# Patient Record
Sex: FEMALE | Race: WHITE
[De-identification: names, ages, dates, MRNs, and addresses within clinical notes are randomized per-mention and may not be internally consistent; named-entity substitution may affect disease eponyms.]

---

## 2017-10-20 ENCOUNTER — HOSPITAL ENCOUNTER (OUTPATIENT)
Dept: HOSPITAL 45 - C.EDB | Age: 57
Setting detail: OBSERVATION
LOS: 1 days | Discharge: HOME | End: 2017-10-21
Attending: FAMILY MEDICINE | Admitting: HOSPITALIST
Payer: COMMERCIAL

## 2017-10-20 VITALS
HEIGHT: 65 IN | WEIGHT: 177.03 LBS | BODY MASS INDEX: 29.49 KG/M2 | HEIGHT: 65 IN | BODY MASS INDEX: 29.49 KG/M2 | WEIGHT: 177.03 LBS

## 2017-10-20 VITALS — DIASTOLIC BLOOD PRESSURE: 80 MMHG | SYSTOLIC BLOOD PRESSURE: 145 MMHG | TEMPERATURE: 97.88 F | HEART RATE: 69 BPM

## 2017-10-20 VITALS
TEMPERATURE: 97.7 F | DIASTOLIC BLOOD PRESSURE: 77 MMHG | HEART RATE: 72 BPM | OXYGEN SATURATION: 97 % | SYSTOLIC BLOOD PRESSURE: 125 MMHG

## 2017-10-20 VITALS — OXYGEN SATURATION: 95 %

## 2017-10-20 DIAGNOSIS — K21.9: ICD-10-CM

## 2017-10-20 DIAGNOSIS — E03.9: ICD-10-CM

## 2017-10-20 DIAGNOSIS — Z79.899: ICD-10-CM

## 2017-10-20 DIAGNOSIS — R07.2: Primary | ICD-10-CM

## 2017-10-20 DIAGNOSIS — I10: ICD-10-CM

## 2017-10-20 DIAGNOSIS — E11.9: ICD-10-CM

## 2017-10-20 LAB
ALBUMIN/GLOB SERPL: 1.1 {RATIO} (ref 0.9–2)
ALP SERPL-CCNC: 71 U/L (ref 45–117)
ALT SERPL-CCNC: 29 U/L (ref 12–78)
ANION GAP SERPL CALC-SCNC: 7 MMOL/L (ref 3–11)
AST SERPL-CCNC: 24 U/L (ref 15–37)
BUN SERPL-MCNC: 15 MG/DL (ref 7–18)
BUN/CREAT SERPL: 17.9 (ref 10–20)
CALCIUM SERPL-MCNC: 9.6 MG/DL (ref 8.5–10.1)
CHLORIDE SERPL-SCNC: 104 MMOL/L (ref 98–107)
CKMB/CK RATIO: 1.1 (ref 0–3)
CO2 SERPL-SCNC: 27 MMOL/L (ref 21–32)
CREAT CL PREDICTED SERPL C-G-VRATE: 77.4 ML/MIN
CREAT SERPL-MCNC: 0.86 MG/DL (ref 0.6–1.2)
EOSINOPHIL NFR BLD AUTO: 172 K/UL (ref 130–400)
GLOBULIN SER-MCNC: 3.7 GM/DL (ref 2.5–4)
GLUCOSE SERPL-MCNC: 108 MG/DL (ref 70–99)
HCT VFR BLD CALC: 41.6 % (ref 37–47)
INR PPP: 0.9 (ref 0.9–1.1)
MCH RBC QN AUTO: 30.3 PG (ref 25–34)
MCHC RBC AUTO-ENTMCNC: 34.1 G/DL (ref 32–36)
MCV RBC AUTO: 88.7 FL (ref 80–100)
PARTIAL THROMBOPLASTIN RATIO: 0.9
PMV BLD AUTO: 11.8 FL (ref 7.4–10.4)
POTASSIUM SERPL-SCNC: 4 MMOL/L (ref 3.5–5.1)
PROTHROMBIN TIME: 10 SECONDS (ref 9–12)
RBC # BLD AUTO: 4.69 M/UL (ref 4.2–5.4)
SODIUM SERPL-SCNC: 138 MMOL/L (ref 136–145)
WBC # BLD AUTO: 9.05 K/UL (ref 4.8–10.8)

## 2017-10-20 NOTE — HISTORY AND PHYSICAL
History & Physical


Date & Time of Service:


Oct 20, 2017 at 21:11


Chief Complaint:


Chest Pain


Primary Care Physician:


Erma Verma


History of Present Illness


Source:  patient, family


The patient is a 57 y/o F who presents with complaints of chest pain that 

started around 3 hours ago. Her pain is a 3/10 and is intermittent. Radiates to 

her lower chest. She also reports having had leg cramps last night as well as 

some heart burn that persisted into today. Denies chest pain or shortness of 

breath with inspiration. The patient notes that she does get leg cramps 

intermittently, but last night the cramping was worse than normal. She denies 

any recent travel except for a 1 hr car ride. She also reports headaches 

intermittently. Overall she just doesn't feel like herself and fatigued.  She 

was also recently started on Synthroid. She also noted that her blood pressure 

was waxing and waning. 





She went to the [facility] walk in before coming to the ER where she was told 

her EKG had some changes. She was given aspirin at the clinic. She does not 

have prior cardiac history. Her father did pass from a PE. She has no personal 

history of blood clots. She has had some stressors recently with her son and 

cat that passed away. 





Denies smoking


Denies history of MI or Strokes


Stays home





Past Medical/Surgical History


Medical Problems:


(1) Diabetes


Status: Chronic  





(2) History of bowel removal from diverticulitis


Status: Chronic  





(3) HTN (hypertension)


Status: Chronic  











Family History





Diabetes mellitus


FH: pulmonary embolism


Gallbladder disease


Heart disease


Hypertension





Social History


Smoking Status:  Never Smoker


Marital Status:  


Occupational Status:  employed





Immunizations


History of Influenza Vaccine:  No


History of Tetanus Vaccine?:  No


Tetanus Immunization Date:  Feb 13, 2005


History of Pneumococcal:  No


History of Hepatitis B Vaccine:  No





Multi-Drug Resistant Organisms


History of MDRO:  No





Allergies


Coded Allergies:  


     Morphine (Verified  Allergy, Intermediate, RASH, 10/20/17)





Home Medications


Scheduled


Hctz/Lisinopril (PRINZIDE 20/12.5 Mg), 0.5 TAB PO DAILY


Levothyroxine Sodium (Synthroid), 25 MCG PO DAILY


Multiple Vitamin (Multivitamin), 1 TAB PO QAM


[Essential Oils], 1 CAP PO QAM





Review of Systems


Constitutional:  No fever, No chills


Eyes:  No worsening of vision


Respiratory:  No cough, No sputum, No wheezing, No shortness of breath, No 

dyspnea on exertion, No dyspnea at rest


Cardiovascular:  + chest pain, No edema


Abdomen:  + nausea, No pain, No vomiting, No diarrhea, No constipation


Genitourinary - Female:  No dysuria, No urinary frequency, No urinary urgency





Physical Exam


Vital Signs











  Date Time  Temp Pulse Resp B/P (MAP) Pulse Ox O2 Delivery O2 Flow Rate FiO2


 


10/20/17 20:06  90 17 115/73 96 Room Air  


 


10/20/17 19:42  87 20  93   


 


10/20/17 19:31    133/100    


 


10/20/17 19:12  90 22  92   


 


10/20/17 19:01    155/96    


 


10/20/17 18:53  97      


 


10/20/17 18:50    148/86    


 


10/20/17 18:42     95 Room Air  


 


10/20/17 18:42     95 Room Air  


 


10/20/17 18:42 37.2 80 17 148/86 95 Room Air  








General Appearance:  no apparent distress


Head:  normocephalic, atraumatic


Eyes:  normal inspection, PERRL, EOMI


ENT:  hearing grossly normal, pharynx normal


Neck:  no adenopathy, no JVD


Respiratory/Chest:  lungs clear, normal breath sounds, no respiratory distress, 

no accessory muscle use


Cardiovascular:  regular rate, rhythm, no edema, no murmur, normal peripheral 

pulses


Abdomen/GI:  normal bowel sounds, non tender, soft


Back:  no CVA tenderness, no muscle spasm, normal range of motion


Extremities/Musculoskelatal:  no calf tenderness, no pedal edema


Neurologic/Psych:  CNs II-XII nml as tested, no motor/sensory deficits, alert, 

normal mood/affect, normal reflexes, oriented x 3





Diagnostics


Laboratory Results





Results Past 24 Hours








Test


  10/20/17


19:00 10/20/17


19:12 10/20/17


20:19 10/20/17


20:56 Range/Units


 


 


White Blood Count 9.05    4.8-10.8  K/uL


 


Red Blood Count 4.69    4.2-5.4  M/uL


 


Hemoglobin 14.2    12.0-16.0  g/dL


 


Hematocrit 41.6    37-47  %


 


Mean Corpuscular Volume 88.7      fL


 


Mean Corpuscular Hemoglobin 30.3    25-34  pg


 


Mean Corpuscular Hemoglobin


Concent 34.1


  


  


  


  32-36  g/dl


 


 


RDW Standard Deviation 42.8    36.4-46.3  fL


 


RDW Coefficient of Variation 13.3    11.5-14.5  %


 


Platelet Count 172    130-400  K/uL


 


Mean Platelet Volume 11.8    7.4-10.4  fL


 


Sodium Level 138    136-145  mmol/L


 


Potassium Level 4.0    3.5-5.1  mmol/L


 


Chloride Level 104      mmol/L


 


Carbon Dioxide Level 27    21-32  mmol/L


 


Anion Gap 7.0    3-11  mmol/L


 


Blood Urea Nitrogen 15    7-18  mg/dl


 


Creatinine


  0.86


  


  


  


  0.60-1.20


mg/dl


 


Est Creatinine Clear Calc


Drug Dose 77.4


  


  


  


   ml/min


 


 


Estimated GFR (


American) 88.1


  


  


  


   


 


 


Estimated GFR (Non-


American 76.1


  


  


  


   


 


 


BUN/Creatinine Ratio 17.9    10-20  


 


Random Glucose 108    70-99  mg/dl


 


Calcium Level 9.6    8.5-10.1  mg/dl


 


Total Bilirubin 0.3    0.2-1  mg/dl


 


Aspartate Amino Transf


(AST/SGOT) 24


  


  


  


  15-37  U/L


 


 


Alanine Aminotransferase


(ALT/SGPT) 29


  


  


  


  12-78  U/L


 


 


Alkaline Phosphatase 71      U/L


 


Total Creatine Kinase 157      U/L


 


Creatine Kinase MB 1.7    0.5-3.6  ng/ml


 


Creatine Kinase MB Ratio 1.1    0-3.0  


 


Total Protein 7.8    6.4-8.2  gm/dl


 


Albumin 4.1    3.4-5.0  gm/dl


 


Globulin 3.7    2.5-4.0  gm/dl


 


Albumin/Globulin Ratio 1.1    0.9-2  


 


Bedside Troponin I  < 0.030   0-0.045  ng/ml


 


Test


  10/20/17


21:04 


  


  


  Range/Units


 











Impression


Assessment and Plan


This is a 57 y/o F who presents with chest pain, fatigue and overall feeling 

"unwell". 





Chest pain r/o vs. inflammatory conditions vs. GERD vs. anxiety:


Initial troponin negative


trend x 3 


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


Hold on stress echo


?inflammatory conditions- ESR, CELSO ordered


D-Dimer pending





Diabetes, diet controlled


Diabetic diet





HTN


HCTZ/Losartan





DVT proph


Lovenox





Gerd


Protonix





COde:


Full








Attending Addendum:








I have physically seen and examined this patient, have directed the resident's 

medical activities, and agree with the H&P as noted above with the following 

exceptions as noted.





The patient is awake,  alert and oriented 3, well-developed and well-nourished

, normocephalic and atraumatic, lying in bed and in no acute distress.





HEENT--PERRL, EOMI, mucous membranes  and oropharynx dry.


Neck--supple, no JVD or bruits, thyroid normal, trachea midline, no adenopathy.


Heart--normal S1 and S2, no extra beats, no murmurs, rubs or gallops.


Lungs--clear bilaterally with good air movement, no respiratory distress, no 

accessory muscle use.


Abdomen--normal bowel sounds and soft, nontender and nondistended, no hernias 

or masses,  no organomegaly.


Extremities--no cyanosis, clubbing or edema. There are good distal pulses b/l.


Dermatologic--normal skin turgor, normal color, warm and dry, no abnormal lymph 

nodes, no rash.


Neurologic--cranial nerves II through XII grossly intact.


Rheumatologic--normal range of motion.


Psychiatric--normal affect.








Assessment and Plan:








Precordial chest pain/myalgias and arthralgias/fatigue--


The patient will be admitted to telemetry for serial cardiac enzymes, cardiac 

rhythm monitoring and a 2-D echocardiogram with Dopplers.


Order sedimentation rate, CELSO and Lyme.


D-dimer pending.





Diet-controlled diabetes mellitus--


Place on Accu-Cheks before meals and at bedtime with NovoLog coverage per 

scale. 





Hypertension--


Continue HCTZ/losartan.





GERD--continue Protonix








Level of Care


Telemetry





Advanced Directives


Existing Advance Directive:  No


Existing Living Will:  No


Existing Power of :  No





Resuscitation Status


FULL RESUSCITATION





VTE Prophylaxis


VTE Risk Assessment Done? Y/N:  Yes


Risk Level:  Moderate


Given or contraindicated:  SCD's





Social Service Consult


None Apply

## 2017-10-20 NOTE — EMERGENCY ROOM VISIT NOTE
History


Report prepared by Cuate:  Agustin George


Under the Supervision of:  Dr. Gretchen Magaña M.D.


First contact with patient:  19:25


Chief Complaint:  PALPITATIONS


Stated Complaint:  CHEST PAIN


Nursing Triage Summary:  


Pt reports that today at noon, while grocery shopping pt began to exerience 


palpitations. Pt reported feeling a "twinge" in the middle of the chest. Slight 


nausea. [place] like her heart was racing. Also reporting heart burn, and 

headache. 


Symptoms persisted off and on throughout the day and pt went to walk-in clinic 


at 1800. EKG performed there and found to be "abnormal". Given 324 ASA. Sent to 


[facility] via Ambulance. 











Hx of HTN and acid reflux. Tx reflux with peppermint oil beads.





History of Present Illness


The patient is a 56 year old female who presents to the Emergency Room via EMS 

with complaints of persistent chest pain that started around 3 hours ago. She 

says that last night she had cramping in her legs and toes, and had really bad 

heartburn. The patient notes that she does get leg cramps intermittently, but 

last night the cramping was worse than normal. She adds that her left leg was 

the most painful. The patient says that her chest pain started around 3 hours 

ago, and she describes it as intermittent quick shooting pains, which started 

underneath her breast bone, and then moved up to the middle of her chest. She 

notes that it is not very painful taking a deep breath. The patient currently 

rates her pain as a 3 out of 10 in severity. She adds that she has a headache 

too, but has been getting some headaches recently. She notes that over the past 

few months, she has felt "weird" and very tired if she overexerts herself. She 

went to her doctor, and was put on thyroid medication 7 weeks ago. She notes 

that she has not been having chest pain with exertion, but she has been getting 

a bit short of breath. The patient denies a worsening cough. She notes no 

history of blood clots, but her father did die of a pulmonary embolism. She 

notes no recent trips or surgeries, but she adds that she has been under a lot 

of stress recently, with her son being in some trouble and her cat dying. The 

patient adds that she was given Aspirin at the clinic prior to arrival today. 

She has a family history of heart disease. The patient is not on any blood 

thinners and she is a non-smoker.





   Source of History:  patient


   Onset:  3 hours ago


   Position:  chest


   Quality:  other (quick shooting pains)


   Timing:  other (persistent)


   Associated Symptoms:  + headache, + SOB (recently if exerts herself too much)

, + fatigue (over past few months ), No cough (any worsened)


Note:


Associated symptoms: Last night had cramping in legs and toes and had bad 

heartburn.





Review of Systems


See HPI for pertinent positives & negatives. A total of 10 systems reviewed and 

were otherwise negative.





Past Medical & Surgical


Medical Problems:


(1) Diabetes


(2) History of bowel removal from diverticulitis


(3) HTN (hypertension)








Family History





Diabetes mellitus


FH: pulmonary embolism


Gallbladder disease


Heart disease


Hypertension





Social History


Smoking Status:  Never Smoker


Marital Status:  


Housing Status:  lives with family


Occupation Status:  employed





Current/Historical Medications


Scheduled


Hctz/Lisinopril (PRINZIDE 20/12.5 Mg), 0.5 TAB PO DAILY


Levothyroxine Sodium (Synthroid), 25 MCG PO DAILY


Multiple Vitamin (Multivitamin), 1 TAB PO QAM


[Essential Oils], 1 CAP PO QAM





Allergies


Coded Allergies:  


     Morphine (Verified  Allergy, Intermediate, RASH, 10/20/17)





Physical Exam


Vital Signs











  Date Time  Temp Pulse Resp B/P (MAP) Pulse Ox O2 Delivery O2 Flow Rate FiO2


 


10/20/17 21:01    108/86    


 


10/20/17 20:47  87 16  97   


 


10/20/17 20:30    113/70    


 


10/20/17 20:17  88 20  96   


 


10/20/17 20:06  90 17 115/73 96 Room Air  


 


10/20/17 20:06    115/73    


 


10/20/17 19:47  93 29  94   


 


10/20/17 19:42  87 20  93   


 


10/20/17 19:31    133/100    


 


10/20/17 19:12  90 22  92   


 


10/20/17 19:01    155/96    


 


10/20/17 18:53  97      


 


10/20/17 18:50    148/86    


 


10/20/17 18:42     95 Room Air  


 


10/20/17 18:42     95 Room Air  


 


10/20/17 18:42 37.2 80 17 148/86 95 Room Air  











Physical Exam


Vital signs reviewed.





General: Well-appearing 56 year old female, in no significant distress.





HEENT: No scleral icterus, PERRLA, neck supple.  Atraumatic.





Cardiovascular: Regular rate and rhythm, no extra sounds.





Pulmonary: Clear to auscultation bilaterally, normal work of breathing.





Abdomen: Soft, nontender, nondistended, positive bowel sounds.





Musculoskeletal: Atraumatic, no peripheral edema.





Neurologic: Patient awake alert and oriented x 3, full strength in all 4 

extremities.  Cranial nerves 2 through 12 grossly intact.





Skin: Warm, dry, no rash





Medical Decision & Procedures


ER Provider


Diagnostic Interpretation:


X-ray results as stated below per interpretation by me and the radiologist: 








CHEST ONE VIEW PORTABLE





CLINICAL HISTORY: Chest pain.    





COMPARISON STUDY:  Chest radiograph November 30, 2017.





FINDINGS: Lung volumes are normal. Mild left basilar opacity is suggestive of


atelectasis. There is no evidence of pulmonary edema. There is no consolidation


to suggest pneumonia. The cardiomediastinal silhouette is normal. 





IMPRESSION:  No acute cardiopulmonary findings. 





Electronically signed by:  Leonid Anthony M.D.


10/20/2017 7:40 PM





Dictated Date/Time:  10/20/2017 7:39 PM





Laboratory Results


10/20/17 19:00








10/20/17 19:00

















Test


  10/20/17


19:00 10/20/17


19:12


 


Red Blood Count


  4.69 M/uL


(4.2-5.4) 


 


 


Mean Corpuscular Volume


  88.7 fL


() 


 


 


Mean Corpuscular Hemoglobin


  30.3 pg


(25-34) 


 


 


Mean Corpuscular Hemoglobin


Concent 34.1 g/dl


(32-36) 


 


 


RDW Standard Deviation


  42.8 fL


(36.4-46.3) 


 


 


RDW Coefficient of Variation


  13.3 %


(11.5-14.5) 


 


 


Mean Platelet Volume


  11.8 fL


(7.4-10.4) 


 


 


Anion Gap


  7.0 mmol/L


(3-11) 


 


 


Est Creatinine Clear Calc


Drug Dose 77.4 ml/min 


  


 


 


Estimated GFR (


American) 88.1 


  


 


 


Estimated GFR (Non-


American 76.1 


  


 


 


BUN/Creatinine Ratio 17.9 (10-20)  


 


Calcium Level


  9.6 mg/dl


(8.5-10.1) 


 


 


Total Bilirubin


  0.3 mg/dl


(0.2-1) 


 


 


Aspartate Amino Transf


(AST/SGOT) 24 U/L (15-37) 


  


 


 


Alanine Aminotransferase


(ALT/SGPT) 29 U/L (12-78) 


  


 


 


Alkaline Phosphatase


  71 U/L


() 


 


 


Total Creatine Kinase


  157 U/L


() 


 


 


Creatine Kinase MB


  1.7 ng/ml


(0.5-3.6) 


 


 


Creatine Kinase MB Ratio 1.1 (0-3.0)  


 


Total Protein


  7.8 gm/dl


(6.4-8.2) 


 


 


Albumin


  4.1 gm/dl


(3.4-5.0) 


 


 


Globulin


  3.7 gm/dl


(2.5-4.0) 


 


 


Albumin/Globulin Ratio 1.1 (0.9-2)  


 


Bedside Troponin I


  


  < 0.030 ng/ml


(0-0.045)





Laboratory results per my review.





Medications Administered











 Medications


  (Trade)  Dose


 Ordered  Sig/Alissa


 Route  Start Time


 Stop Time Status Last Admin


Dose Admin


 


 Nitroglycerin


  (Nitrostat Tab)  0.4 mg  Q5M  PRN


 SL  10/20/17 19:45


 10/20/17 21:50 DC 10/20/17 19:51


0.4 MG


 


 Acetaminophen


  (Tylenol Tab)  650 mg  NOW  STAT


 PO  10/20/17 19:34


 10/20/17 19:35 DC 10/20/17 19:51


650 MG











ECG


Indication:  chest pain


Rate (beats per minute):  85


Rhythm:  normal sinus


Findings:  no acute ischemic change, no ectopy, other (previous septal infarct)





ED Course


1926: Past medical records reviewed. The patient was evaluated in room B5. A 

complete history and physical examination was performed. The patient verbally 

expressed understanding and agreement of the treatment plan. The patient will 

be evaluated for further treatment.





1934: Ordered Tylenol Tab 650 mg PO.





1945: Ordered Nitrostat Tab 0.4 mg SL PRN.





2040: I discussed the patient with Dr. Cochran - [facility] internist - he will 

evaluate the patient for further treatment.





Medical Decision


DDx:


Acute coronary syndrome, pulmonary embolus, aortic dissection, musculoskeletal 

pain, pneumonia, pleural effusion, pneumothorax





This patient was evaluated and appeared to be in no significant distress.  IV 

access was obtained and laboratory work was drawn.  Patient was given 

sublingual nitroglycerin patient was given oral Tylenol for her headache.  EKG 

reveals no evidence of acute ischemia.  Laboratory work reveals normal cardiac 

enzymes.  Chest x-ray was obtained and is clear.  Given the patient's history 

of diabetes and hypertension as well as the short duration of her chest pain, 

the patient will be evaluated by the hospitalist service for further 

management.  She is aware of the plan and agrees.





Medication Reconcilliation


Current Medication List:  was personally reviewed by me





Blood Pressure Screening


Patient's blood pressure:  Elevated blood pressure


Referred to hospitalist.





Consults


Time Called:  2030


Consulting Physician:  Dr. Sammie TRIPP internist


Returned Call:  2040


I discussed the patient with Dr. Sammie TRIPP internist - he will 

evaluate the patient for further treatment.





Impression





 Primary Impression:  


 Substernal chest pain


 Additional Impression:  


 HTN (hypertension)





Scribe Attestation


The scribe's documentation has been prepared under my direction and personally 

reviewed by me in its entirety. I confirm that the note above accurately 

reflects all work, treatment, procedures, and medical decision making performed 

by me.





Departure Information


Dispostion


Being Evaluated By Hospitalist





Referrals


Erma Verma (PCP)





Patient Instructions


[facility]





Problem Qualifiers

## 2017-10-20 NOTE — DIAGNOSTIC IMAGING REPORT
CHEST ONE VIEW PORTABLE



CLINICAL HISTORY: Chest pain.    



COMPARISON STUDY:  Chest radiograph November 30, 2017.



FINDINGS: Lung volumes are normal. Mild left basilar opacity is suggestive of

atelectasis. There is no evidence of pulmonary edema. There is no consolidation

to suggest pneumonia. The cardiomediastinal silhouette is normal. 



IMPRESSION:  No acute cardiopulmonary findings. 









Electronically signed by:  Leonid Anthony M.D.

10/20/2017 7:40 PM



Dictated Date/Time:  10/20/2017 7:39 PM

## 2017-10-21 VITALS
TEMPERATURE: 97.52 F | OXYGEN SATURATION: 99 % | SYSTOLIC BLOOD PRESSURE: 143 MMHG | DIASTOLIC BLOOD PRESSURE: 89 MMHG | HEART RATE: 63 BPM

## 2017-10-21 VITALS
HEART RATE: 70 BPM | OXYGEN SATURATION: 94 % | TEMPERATURE: 97.7 F | SYSTOLIC BLOOD PRESSURE: 110 MMHG | DIASTOLIC BLOOD PRESSURE: 74 MMHG

## 2017-10-21 VITALS
SYSTOLIC BLOOD PRESSURE: 143 MMHG | OXYGEN SATURATION: 99 % | HEART RATE: 63 BPM | DIASTOLIC BLOOD PRESSURE: 89 MMHG | TEMPERATURE: 97.52 F

## 2017-10-21 LAB
CHOLEST/HDLC SERPL: 3 {RATIO}
GLUCOSE UR QL: 63 MG/DL
KETONES UR QL STRIP: 110 MG/DL
NITRITE UR QL STRIP: 80 MG/DL (ref 0–150)
PH UR: 189 MG/DL (ref 0–200)
VERY LOW DENSITY LIPOPROT CALC: 16 MG/DL

## 2017-10-21 NOTE — DISCHARGE SUMMARY
Discharge Summary


Date of Service


Oct 21, 2017.


 (Ramón Erazo M.D.)





Discharge Summary


Admission Date:


Oct 20, 2017 at 21:08


Discharge Date:  Oct 21, 2017


Discharge Disposition:  Home


Principal Diagnosis:  Chest pain of unknown origin, likely pleuritic.


Problems/Secondary Diagnoses:


(1) HTN (hypertension)


Status: Chronic  








Immunizations:  


   Have You Had Influenza Vaccine:  No


   History of Tetanus Vaccine?:  No


   Tetanus Immunization Date:  Feb 13, 2005


   History of Pneumococcal:  No


   History of Hepatitis B Vaccine:  No


Procedures:


Stress Echocardiogram





1. Normal stress echocardiogram at 8.9 METS and a peak heart rate of 96% 

predicted maximum.


2. No exercise-induced chest pain.


3. No EKG changes.


4. Baseline echocardiogram notes normal left ventricular systolic function.





***************************************





Repeat EKG 


Normal sinus rhythm


Normal ECG


When compared with ECG of 20-OCT-2017 18:49, (unconfirmed)


Criteria for Septal infarct are no longer Present





*************************************





CHEST ONE VIEW PORTABLE





CLINICAL HISTORY: Chest pain.    





COMPARISON STUDY:  Chest radiograph November 30, 2017.





FINDINGS: Lung volumes are normal. Mild left basilar opacity is suggestive of


atelectasis. There is no evidence of pulmonary edema. There is no consolidation


to suggest pneumonia. The cardiomediastinal silhouette is normal. 





IMPRESSION:  No acute cardiopulmonary findings. 


 (Ramón Erazo M.D.)





Medication Reconciliation


Continued Medications:  


Hctz/Lisinopril (PRINZIDE 20/12.5 Mg) 1 Ea Tab


0.5 TAB PO DAILY, TAB





Levothyroxine Sodium (Synthroid) 25 Mcg Tab


25 MCG PO DAILY, TAB





Multiple Vitamin (Multivitamin) 1 Tab Tab


1 TAB PO QAM, TAB





[Essential Oils] ()  


1 CAP PO QAM











Discharge Exam


The patient was seen and examined at bedside.  No acute overnight events.  

Telemetry showed sinus rhythm in the 60s and 70s.  





Patient is resting comfortably in bed.  Only reports having chest pain on deep 

inhalation.  Pt is on room air.  





Plan of care was described to the patient and all questions were answered.


Review of Systems:  


   Constitutional:  No fever, No weight loss, No weakness


   ENT:  No hearing loss


   Respiratory:  No cough, No sputum, No shortness of breath, No dyspnea on 

exertion


   Cardiovascular:  No orthopnea, No edema


   Abdomen:  No pain, No nausea, No vomiting, No diarrhea, No constipation


   Genitourinary - Female:  No dysuria


   Psychiatric:  No anxiety


   Endocrine:  No fatigue, No excessive thirst


   Integumentary:  No rash


Physical Exam:  


   General Appearance:  WD/WN, no apparent distress


   Eyes:  normal inspection


   ENT:  normal ENT inspection


   Neck:  supple


   Respiratory/Chest:  chest non-tender, lungs clear, normal breath sounds, no 

respiratory distress, no accessory muscle use


   Cardiovascular:  regular rate, rhythm, no edema, no gallop, no JVD, no murmur

, normal peripheral pulses


   Abdomen / GI:  normal bowel sounds, non tender, soft, no organomegaly, no 

pulsatile mass, normal rectal exam


   Extremities:  normal inspection, no calf tenderness (bilaterally), no pedal 

edema, normal range of motion, non-tender


   Neurologic/Psychiatric:  CNs II-XII nml as tested, no motor/sensory deficits

, alert, normal mood/affect, normal reflexes, oriented x 3


   Skin:  normal color, warm/dry, no rash


 (Ramón Erazo M.D.)


has had occasional twinges in chest this morning. none like last night


Review of Systems:  


   Constitutional:  No fever


   Respiratory:  No shortness of breath


   Abdomen:  No pain


Physical Exam:  


   General Appearance:  no apparent distress


   Respiratory/Chest:  lungs clear, no respiratory distress


   Cardiovascular:  regular rate, rhythm


   Abdomen / GI:  soft


   Neurologic/Psychiatric:  alert, oriented x 3


 (Michelle Burton M.D.)


Hospital Course


56F with a PMHx of HTN and Hypothyroidism presented to the ER c/o crushing 

chest pain. Nonspecific changes on admission EKG.  Repeat EKGs showed normal 

sinus rhythm.  Pt was also complaining of bilateral toe pain and left ankle 

pain on admission that has resolved.  D-Dimer was 250, however she does have a 

family history on her fathers side and paternal grandfather.





Patient's troponins were negative x 3.  Stress echocardiogram results are as 

above and were grossly normal.





Patient will be discharged in good condition. 





Pt advised to follow up with PCP within two weeks.





Pt's fasting lipids, age, BP and non smoking history put patient at 3.6% 10 

year MI/Stroke risk, ergo she does not meet criteria for daily ASA 81mg 

prophylaxis.


Total Time Spent:  Greater than 30 minutes (33 minutes)


This includes examination of the patient, discharge planning, medication 

reconciliation, and communication with other providers.


 (Ramón Erazo M.D.)


Resident Physician Supervision Note:


I independently interviewed and examined the patient and verified the key 

history and physical, reviewed labs and image studies, discussed the case with 

the resident Dr. Erazo and agree with the findings and care plan.


Total Time Spent:  Greater than 30 minutes (35)


 (Michelle Burton M.D.)


Discharge Instructions


Please refer to the electronic Patient Visit Report (Discharge Instructions) 

for additional information.


 (Ramón Erazo M.D.)





Follow-Up


Follow up with PCP in one week. 


 (Ramón Erazo M.D.)





Additional Copies To


Erma Verma


Resident Involvement:  Resident Care Provided


Care Provided:  [facility]


 (Ramón Erazo M.D.)

## 2017-10-21 NOTE — EXERCISE STRESS ECHO
*NOTICE TO RECEIVING PARTY AGENCY**  This information is strictly Confidential and protected under 
[place] law.  [place] law prohibits you from making any further disclosure of this 
information unless further disclosure is expressly permitted by the written consent of the person to 
whom it pertains or is authorized by law.  A general authorization for the release of medical or 
other information is not sufficient for this purpose.  Hospital accepts no responsibility if the 
information is made available to any other person, INCLUDING THE PATIENT.



Interpretation Summary

  *  Name: VENKATA WALLACE  Study Date: 10/21/2017 11:03 AM  BP: 126/90 mmHg

  *  MRN: [id number]  Patient Location: .2T\S\S234\S\1  HR: 81

  *  : 1960 (M/d/yyyy)  Gender: Female  Height: 65 in

  *  Age: 56 yrs  Ethnicity: [place]  Weight: 177 lb

  *  Ordering Physician: Michelle Burton

  *  Referring Physician: Self, Referred

  *  Performed By: PATRICIA Valente RCS

  *  Accession# [id number]  Account# [id number]

  *  Reason For Study: CHEST PAIN

  *  BSA: 1.9 m2

  *  -- Conclusions --

  *  1. Normal stress echocardiogram at 8.9 METS and a peak heart rate of 96% predicted maximum.

  *  2. No exercise-induced chest pain.

  *  3. No EKG changes.

  *  4. Baseline echocardiogram notes normal left ventricular systolic function.

Procedure Details

  *  ECHOEX, CPT #55739

Left Ventricle

  *  The left ventricle is normal in size.

  *  There is normal left ventricular wall thickness.

  *  Left ventricular systolic function is normal.

  *  Resting wall motion: Normal.  Stress wall motion: Appropriate increase in Left ventricular 
systolic function and decrease in cavity size.  No stress induced segmental wall motion 
abnormalities.

Stress Parameters

  *  Normal baseline electrocardiogram.

  *  The stress ECG response was normal

  *  The stress portion of this study was personally supervised by the undersigned interpreting 
physician.

  *  Rest heart rate was '81' BPM.

  *  Rest blood pressure was '126/90'

  *  Maximum heart rate achieved was 160 bpm.

  *  Maximum heart rate was 96 % of maximum age-predicted heart rate.

  *  Maximum blood pressure was '205/84'

  *  Total exercise time was '7:15'

  *  Maximum exercise MET level achieved was '8.9' METS

  *  Maximum treadmill speed was '3.4' miles per hour.

  *  Maximum treadmill elevation was '14'% grade.

  *  Exercise was terminated due to 'fatigue after achieving target heart rate'

## 2017-10-21 NOTE — DISCHARGE INSTRUCTIONS
Discharge Instructions


Date of Service


Oct 21, 2017.





Admission


Reason for Admission:  Substernal Chest Pain





Discharge


Discharge Diagnosis / Problem:  Chest Pain of Unknown Origin





Discharge Goals


Goal(s):  Decrease discomfort, Improve function, Increase independence, Improve 

disease control, Improve nutritional status, Learn about illness





Activity Recommendations


Activity Limitations:  per Instructions/Follow-up section





.





Instructions / Follow-Up


Instructions / Follow-Up


You were admitted to the hospital for a chest pain rule out.





Your initial EKG showed evidence of a previous heart attack. Your two follow up 

EKGs did not show evidence of a previous heart attack.  





Your blood work did not show any leakage from your heart of Troponin (an enzyme 

released by a heart in distress).  The troponin level was checked three times 

and was undetectable each time.





The blood work showed a D-Dimer level of 250.  This number means you are 

unlikely to have a blood clot in your body.  





You have excellent cholesterol levels.  Your cholesterol level, taking into 

account your gender, blood pressure and age, means you do not need to be on a 

daily Aspirin.  





Please follow up with your PCP in one week.





We recommend a cardiac stress test to evaluate your heart function.  Please 

mention this to your PCP, they would be able to arrange it.  A copy of your 

hospital records will also be sent to your PCP - and it will also mention our 

recommendation for an outpatient cardiac stress test. Information on exercise 

stress tests (a subtype of cardiac stress test) will be included in your 

discharge paperwork. 





Continue to eat healthy and take your blood pressure and thyroid medications as 

prescribed.





Current Hospital Diet


Patient's current hospital diet: Diabetes Type 2 Diet





Discharge Diet


Recommended Diet:  AHA Diet (Heart Healthy)





Pending Studies


Studies pending at discharge:  no





Laboratory Results





Lipid Panel








Test


  10/21/17


03:20 Range/Units


 


 


Triglycerides Level 80  0-150  mg/dl


 


Cholesterol Level 189  0-200  mg/dl


 


HDL Cholesterol 63   mg/dl


 


Cholesterol/HDL Ratio 3.0   


 


LDL Cholesterol, Calculated 110   mg/dl











Medical Emergencies








.


Who to Call and When:





Medical Emergencies:  If at any time you feel your situation is an emergency, 

please call 911 immediately.





.





Non-Emergent Contact


Non-Emergency issues call your:  Primary Care Provider





.


.








"Provider Documentation" section prepared by Ramón Erazo.








.





VTE Core Measure


Inpt VTE Proph given/why not?:  Enoxaparin (Lovenox)SQ


Resident Involvement:  Resident Care Provided


Care Provided:  Adult Hospital Medicine

## 2018-04-04 ENCOUNTER — HOSPITAL ENCOUNTER (OUTPATIENT)
Dept: HOSPITAL 45 - C.MAMM | Age: 58
Discharge: HOME | End: 2018-04-04
Attending: NURSE PRACTITIONER
Payer: COMMERCIAL

## 2018-04-04 DIAGNOSIS — N64.4: Primary | ICD-10-CM

## 2018-04-04 NOTE — MAMMOGRAPHY REPORT
BILATERAL DIGITAL DIAGNOSTIC MAMMOGRAM TOMOSYNTHESIS WITH CAD AND TARGETED BILATERAL ULTRASOUND: 4/4/
2018

CLINICAL HISTORY: The patient reports right lateral breast pain for approximately 2 weeks.  She denie
s any clear palpable lumps or other complaints.  





TECHNIQUE:  Breast tomosynthesis in addition to standard 2D mammography was performed. Current study 
was also evaluated with a Computer Aided Detection (CAD) system.  Bilateral CC and MLO and left ML 2D
 and tomosynthesis images were obtained.



COMPARISON: No prior exams were available for comparison.   



BREAST COMPOSITION:  There are scattered areas of fibroglandular density in both breasts.  



FINDINGS: There is an asymmetry seen within the left superior breast on the MLO view, which is less p
rominent on the left ML view and likely represents normal fibroglandular tissue although ultrasound w
as performed.  The remainder of both breasts demonstrate no suspicious masses, calcifications, or are
as of architectural distortion.  A few scattered benign-appearing calcifications are noted.



Targeted ultrasound was performed of the area of pain pointed out by the patient involving the right 
lateral breast.  Sonographically normal tissue is seen, without evidence of a mass or other suspiciou
s sonographic abnormality.  Ultrasound was also performed of the left superior breast in the region o
f the mammographic asymmetry, which also shows no suspicious masses or other suspicious sonographic a
bnormalities.  Given that the asymmetry is less prominent on the lateral view and ultrasound showed n
o sonographic correlate, it is benign and compatible with normal fibroglandular tissue.





IMPRESSION:  ACR BI-RADS CATEGORY 2: BENIGN, TARGETED ULTRASOUND ACR BI-RADS CATEGORY 2: BENIGN 

No suspicious mammographic or sonographic abnormality to explain right lateral breast pain.  There is
 no mammographic evidence of malignancy in either breast.  Recommend clinical follow-up for right jose
ast pain, and recommend routine bilateral screening mammograms in one year. 



The patient has been verbally notified of the results.  





Approximately 10% of breast cancers are not detected with mammography. A negative mammographic report
 should not delay biopsy if a clinically suggestive mass is present.



Jannette Martin M.D.          

/:4/4/2018 12:12:14  



Imaging Technologist: Arlet BATISTA (R)), [facility]

letter sent: Normal 1/2  

BI-RADS Code: ACR BI-RADS Category 2: Benign  Ultrasound BI-RADS: ACR BI-RADS Category 2: Benign